# Patient Record
Sex: FEMALE
[De-identification: names, ages, dates, MRNs, and addresses within clinical notes are randomized per-mention and may not be internally consistent; named-entity substitution may affect disease eponyms.]

---

## 2021-04-03 ENCOUNTER — NURSE TRIAGE (OUTPATIENT)
Dept: OTHER | Facility: CLINIC | Age: 51
End: 2021-04-03

## 2021-04-04 NOTE — TELEPHONE ENCOUNTER
Reason for Disposition   Health Information question, no triage required and triager able to answer question    Answer Assessment - Initial Assessment Questions  1. MAIN CONCERN OR SYMPTOM:  \"What is your main concern right now? \" \"What question do you have? \" \"What's the main symptom you're worried about? \" (e.g., fever, pain, redness, swelling)      Pt reports nasal congestion. She is wondering if she can take a decongestant since she just got the vaccine yesterday. 2. VACCINE: \"What vaccination did you receive? \" \"Is this your first or second shot? \" (e.g., none; Wannetta Mock, other)      Pt got the first [place] vaccine yesterday    3. SYMPTOM ONSET: \"When did the symptoms begin? \" (e.g., not relevant; hours, days)       Today    Protocols used: INFORMATION ONLY CALL - NO TRIAGE-ADULT-, CORONAVIRUS (COVID-19) VACCINE QUESTIONS AND REACTIONS-ADULT-    Brief description of triage:   Pt is calling with c/o nasal congestion. Pt would like to know if it is okay to take a decongestant since she just got the Covid vaccine yesterday. Care advice provided, patient verbalizes understanding; denies any other questions or concerns; instructed to call back for any new or worsening symptoms. This triage is a result of a call to [address] [place] [address]. Please do not respond to the triage nurse through this encounter. Any subsequent communication should be directly with the patient.